# Patient Record
Sex: MALE | Race: WHITE
[De-identification: names, ages, dates, MRNs, and addresses within clinical notes are randomized per-mention and may not be internally consistent; named-entity substitution may affect disease eponyms.]

---

## 2019-08-11 ENCOUNTER — HOSPITAL ENCOUNTER (EMERGENCY)
Dept: HOSPITAL 19 - COL.ER | Age: 84
Discharge: HOME | End: 2019-08-11
Payer: MEDICARE

## 2019-08-11 VITALS — HEART RATE: 61 BPM | DIASTOLIC BLOOD PRESSURE: 91 MMHG | SYSTOLIC BLOOD PRESSURE: 118 MMHG

## 2019-08-11 VITALS — BODY MASS INDEX: 25.96 KG/M2 | HEIGHT: 69.02 IN | WEIGHT: 175.27 LBS

## 2019-08-11 VITALS — TEMPERATURE: 98.2 F

## 2019-08-11 DIAGNOSIS — K21.9: ICD-10-CM

## 2019-08-11 DIAGNOSIS — I10: ICD-10-CM

## 2019-08-11 DIAGNOSIS — Z79.82: ICD-10-CM

## 2019-08-11 DIAGNOSIS — Z95.0: ICD-10-CM

## 2019-08-11 DIAGNOSIS — R07.89: Primary | ICD-10-CM

## 2019-08-11 LAB
ALBUMIN SERPL-MCNC: 4.2 GM/DL (ref 3.5–5)
ALP SERPL-CCNC: 116 U/L (ref 50–136)
ALT SERPL-CCNC: < 6 U/L (ref 21–72)
ANION GAP SERPL CALC-SCNC: 11 MMOL/L (ref 7–16)
APTT PPP: 29.1 SECONDS (ref 26–37)
AST SERPL-CCNC: 25 U/L (ref 15–37)
BASOPHILS # BLD: 0 10*3/UL (ref 0–0.2)
BASOPHILS NFR BLD AUTO: 0.4 % (ref 0–2)
BILIRUB SERPL-MCNC: 1.3 MG/DL (ref 0–1)
BUN SERPL-MCNC: 20 MG/DL (ref 9–20)
CALCIUM SERPL-MCNC: 9.6 MG/DL (ref 8.4–10.2)
CHLORIDE SERPL-SCNC: 106 MMOL/L (ref 98–107)
CO2 SERPL-SCNC: 25 MMOL/L (ref 22–30)
CREAT SERPL-SCNC: 0.92 UMOL/L (ref 0.66–1.25)
CRP SERPL-MCNC: 2.2 MG/DL (ref 0–0.9)
EOSINOPHIL # BLD: 0.2 10*3/UL (ref 0–0.7)
EOSINOPHIL NFR BLD: 2.1 % (ref 0–4)
ERYTHROCYTE [DISTWIDTH] IN BLOOD BY AUTOMATED COUNT: 12.7 % (ref 11.5–14.5)
GLUCOSE SERPL-MCNC: 119 MG/DL (ref 74–106)
GRANULOCYTES # BLD AUTO: 65.5 % (ref 42.2–75.2)
HCT VFR BLD AUTO: 42.1 % (ref 42–52)
HGB BLD-MCNC: 14.2 G/DL (ref 13.5–18)
INR BLD: 1 (ref 0.8–3)
LYMPHOCYTES # BLD: 1.9 10*3/UL (ref 1.2–3.4)
LYMPHOCYTES NFR BLD: 23.2 % (ref 20–51)
MCH RBC QN AUTO: 32 PG (ref 27–31)
MCHC RBC AUTO-ENTMCNC: 34 G/DL (ref 33–37)
MCV RBC AUTO: 96 FL (ref 80–100)
MONOCYTES # BLD: 0.7 10*3/UL (ref 0.1–0.6)
MONOCYTES NFR BLD AUTO: 8.3 % (ref 1.7–9.3)
NEUTROPHILS # BLD: 5.3 10*3/UL (ref 1.4–6.5)
PLATELET # BLD AUTO: 201 K/MM3 (ref 130–400)
PMV BLD AUTO: 10.3 FL (ref 7.4–10.4)
POTASSIUM SERPL-SCNC: 4.1 MMOL/L (ref 3.4–5)
PROT SERPL-MCNC: 7.5 GM/DL (ref 6.4–8.2)
PROTHROMBIN TIME: 11.9 SECONDS (ref 9.7–12.8)
RBC # BLD AUTO: 4.39 M/MM3 (ref 4.2–5.6)
SODIUM SERPL-SCNC: 141 MMOL/L (ref 137–145)
TROPONIN I SERPL-MCNC: < 0.012 NG/ML (ref 0–0.04)

## 2020-05-22 ENCOUNTER — HOSPITAL ENCOUNTER (INPATIENT)
Dept: HOSPITAL 19 - COL.ER | Age: 85
LOS: 6 days | Discharge: NURSING, REHAB OR LONG-TERM CARE | DRG: 469 | End: 2020-05-28
Attending: STUDENT IN AN ORGANIZED HEALTH CARE EDUCATION/TRAINING PROGRAM | Admitting: STUDENT IN AN ORGANIZED HEALTH CARE EDUCATION/TRAINING PROGRAM
Payer: MEDICARE

## 2020-05-22 VITALS — DIASTOLIC BLOOD PRESSURE: 87 MMHG | TEMPERATURE: 97.9 F | SYSTOLIC BLOOD PRESSURE: 145 MMHG | HEART RATE: 86 BPM

## 2020-05-22 VITALS — HEART RATE: 73 BPM | SYSTOLIC BLOOD PRESSURE: 131 MMHG | TEMPERATURE: 99.8 F | DIASTOLIC BLOOD PRESSURE: 66 MMHG

## 2020-05-22 VITALS — BODY MASS INDEX: 35.02 KG/M2 | WEIGHT: 178.35 LBS | HEIGHT: 60 IN

## 2020-05-22 VITALS — TEMPERATURE: 99.9 F | SYSTOLIC BLOOD PRESSURE: 158 MMHG | DIASTOLIC BLOOD PRESSURE: 76 MMHG | HEART RATE: 69 BPM

## 2020-05-22 DIAGNOSIS — J95.821: ICD-10-CM

## 2020-05-22 DIAGNOSIS — J98.11: ICD-10-CM

## 2020-05-22 DIAGNOSIS — H90.42: ICD-10-CM

## 2020-05-22 DIAGNOSIS — Z90.79: ICD-10-CM

## 2020-05-22 DIAGNOSIS — Z51.5: ICD-10-CM

## 2020-05-22 DIAGNOSIS — I95.81: ICD-10-CM

## 2020-05-22 DIAGNOSIS — M51.36: ICD-10-CM

## 2020-05-22 DIAGNOSIS — F41.9: ICD-10-CM

## 2020-05-22 DIAGNOSIS — D64.9: ICD-10-CM

## 2020-05-22 DIAGNOSIS — A41.9: ICD-10-CM

## 2020-05-22 DIAGNOSIS — Z85.46: ICD-10-CM

## 2020-05-22 DIAGNOSIS — R13.10: ICD-10-CM

## 2020-05-22 DIAGNOSIS — M48.00: ICD-10-CM

## 2020-05-22 DIAGNOSIS — Z95.0: ICD-10-CM

## 2020-05-22 DIAGNOSIS — Z66: ICD-10-CM

## 2020-05-22 DIAGNOSIS — I21.A1: ICD-10-CM

## 2020-05-22 DIAGNOSIS — Z95.2: ICD-10-CM
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
## 2020-05-22 DIAGNOSIS — I50.22: ICD-10-CM

## 2020-05-22 DIAGNOSIS — E78.5: ICD-10-CM

## 2020-05-22 DIAGNOSIS — F03.90: ICD-10-CM

## 2020-05-22 DIAGNOSIS — E03.9: ICD-10-CM

## 2020-05-22 DIAGNOSIS — S72.001A: Primary | ICD-10-CM

## 2020-05-22 DIAGNOSIS — M43.16: ICD-10-CM

## 2020-05-22 DIAGNOSIS — I27.20: ICD-10-CM

## 2020-05-22 DIAGNOSIS — I49.5: ICD-10-CM

## 2020-05-22 DIAGNOSIS — K21.9: ICD-10-CM

## 2020-05-22 LAB
ALBUMIN SERPL-MCNC: 4.2 GM/DL (ref 3.5–5)
ALP SERPL-CCNC: 137 U/L (ref 50–136)
ALT SERPL-CCNC: 17 U/L (ref 4–49)
ANION GAP SERPL CALC-SCNC: 8 MMOL/L (ref 7–16)
APTT PPP: 31 SECONDS (ref 26–37)
AST SERPL-CCNC: 29 U/L (ref 15–37)
BASOPHILS # BLD: 0 10*3/UL (ref 0–0.2)
BASOPHILS NFR BLD AUTO: 0.1 % (ref 0–2)
BILIRUB SERPL-MCNC: 1.1 MG/DL (ref 0–1)
BUN SERPL-MCNC: 22 MG/DL (ref 9–20)
CALCIUM SERPL-MCNC: 9.6 MG/DL (ref 8.4–10.2)
CHLORIDE SERPL-SCNC: 104 MMOL/L (ref 98–107)
CO2 SERPL-SCNC: 24 MMOL/L (ref 22–30)
CREAT SERPL-SCNC: 0.93 UMOL/L (ref 0.66–1.25)
EOSINOPHIL # BLD: 0.1 10*3/UL (ref 0–0.7)
EOSINOPHIL NFR BLD: 0.4 % (ref 0–4)
ERYTHROCYTE [DISTWIDTH] IN BLOOD BY AUTOMATED COUNT: 13 % (ref 11.5–14.5)
GLUCOSE SERPL-MCNC: 116 MG/DL (ref 74–106)
GRANULOCYTES # BLD AUTO: 86.4 % (ref 42.2–75.2)
HCT VFR BLD AUTO: 40.9 % (ref 42–52)
HGB BLD-MCNC: 13.4 G/DL (ref 13.5–18)
INR BLD: 1.1 (ref 0.8–3)
LYMPHOCYTES # BLD: 1.2 10*3/UL (ref 1.2–3.4)
LYMPHOCYTES NFR BLD: 8.4 % (ref 20–51)
MCH RBC QN AUTO: 31 PG (ref 27–31)
MCHC RBC AUTO-ENTMCNC: 33 G/DL (ref 33–37)
MCV RBC AUTO: 96 FL (ref 80–100)
MONOCYTES # BLD: 0.5 10*3/UL (ref 0.1–0.6)
MONOCYTES NFR BLD AUTO: 3.8 % (ref 1.7–9.3)
NEUTROPHILS # BLD: 11.9 10*3/UL (ref 1.4–6.5)
PH UR STRIP.AUTO: 5 [PH] (ref 5–8)
PLATELET # BLD AUTO: 196 K/MM3 (ref 130–400)
PMV BLD AUTO: 10 FL (ref 7.4–10.4)
POTASSIUM SERPL-SCNC: 4 MMOL/L (ref 3.4–5)
PROT SERPL-MCNC: 7.4 GM/DL (ref 6.4–8.2)
PROTHROMBIN TIME: 11.9 SECONDS (ref 9.7–12.8)
RBC # BLD AUTO: 4.28 M/MM3 (ref 4.2–5.6)
RBC # UR: (no result) /HPF
SODIUM SERPL-SCNC: 136 MMOL/L (ref 137–145)
SP GR UR STRIP.AUTO: 1.02 (ref 1–1.03)
SQUAMOUS # URNS: (no result) /HPF
TROPONIN I SERPL-MCNC: < 0.012 NG/ML (ref 0–0.04)
URN COLLECT METHOD CLASS: (no result)

## 2020-05-22 PROCEDURE — C1713 ANCHOR/SCREW BN/BN,TIS/BN: HCPCS

## 2020-05-22 PROCEDURE — C1776 JOINT DEVICE (IMPLANTABLE): HCPCS

## 2020-05-22 PROCEDURE — P9047 ALBUMIN (HUMAN), 25%, 50ML: HCPCS

## 2020-05-22 PROCEDURE — A9284 NON-ELECTRONIC SPIROMETER: HCPCS

## 2020-05-22 NOTE — NUR
Due to the patient's history of dementia, SW contacted the patient's wife,
Joanna to complete initial intake. The patient lives in [place] with Joanna.
The patient does not use a cane or walker but they do have those available in
the home. The patient's PCP is Dr. Lopez and patient receives medications via
mail, delivery or  from [facility]. The patient does not have
advanced directives in the EMR. Joanna states they are completed and designate
herself and their daughter Shayla. Shayla lives in [place] and will be
visiting beginning 5/23 to support the family. They have another daughter
named Radha Stoddard who lives close by. Due to the patient's fracture he will
likely need placment. JESUSITA discussed Medicare.gov's list of post acute rehab
facilities in the [place] area. The first choice is [facility].  Joanna was hesistant to give a second choice because she want to
patient to be closer to home. However, she did end up choosing [facility] in [place] as second choice. JESUSITA faxed referrals. Will continue to
monitor.

## 2020-05-22 NOTE — NUR
Patient arrived to floor from ED via bed. Patient was transferred to room bed
via slide board with x3 assist. Patient is alert, appears to be confused/does
not comprehend when questions are asked to him. Patient also appears to be
comfortable, in no obvious pain or distress. IVF initiated per order, MARQUEZ hose
applied to uninjured leg, and rivera catheter inserted per order via sterile
technique. Patient tolerated procedure well. Fall signage placed, bed alarm
on. Admission B completed while on the phone with patient's wife, who is his
DPOA.

## 2020-05-22 NOTE — NUR
Patient has become persistently confused over the last hour. Patient has tried
to get out of bed unassisted several times, continues to deny needs, PRN pain
medication administered per order. Staff member at bedside currently, patient
does not respond to re-orientation. Bed alarm on.

## 2020-05-22 NOTE — NUR
Received report from KAYLEY Madrid. Pt is currently lying in bed looking out of
his window. Pt bed is in lowest position and call light is on

## 2020-05-22 NOTE — NUR
Spoke with DAVID Gomes she informed me that Dr. Boucher would see pt in the am
before surgery. She stated that surgery time would be pushed back to 0900. She
stated she also spoke to pt wife and explain the surgery to her. She stated pt
will go forward with surgery once he is cleared. Pt is also NPO after
midnight. Pt is currently lying in bed awake. Pt has his call light within
reach and his bed is in lowest position. His tay hose is on the unaffected
leg, he has been refusing the SCD pumps on his legs. Pt currently has fluids
running at this time.Pt lungs sounds were clear and heart sounds were normal
S1 and S2 sounds pt bowel sounds were hypoactive in all quads. Pt has been
very quite just looking around and lying in bed. His bed alarm is also on at
this time.

## 2020-05-23 VITALS — OXYGEN SATURATION: 85 %

## 2020-05-23 VITALS — OXYGEN SATURATION: 91 %

## 2020-05-23 VITALS — OXYGEN SATURATION: 89 %

## 2020-05-23 VITALS — OXYGEN SATURATION: 92 %

## 2020-05-23 VITALS — OXYGEN SATURATION: 100 %

## 2020-05-23 VITALS — OXYGEN SATURATION: 97 %

## 2020-05-23 VITALS — OXYGEN SATURATION: 96 %

## 2020-05-23 VITALS — OXYGEN SATURATION: 87 %

## 2020-05-23 VITALS — OXYGEN SATURATION: 94 %

## 2020-05-23 VITALS — TEMPERATURE: 98.5 F | HEART RATE: 77 BPM | DIASTOLIC BLOOD PRESSURE: 65 MMHG | SYSTOLIC BLOOD PRESSURE: 121 MMHG

## 2020-05-23 VITALS — OXYGEN SATURATION: 95 %

## 2020-05-23 VITALS — DIASTOLIC BLOOD PRESSURE: 69 MMHG | TEMPERATURE: 98.2 F | HEART RATE: 69 BPM | SYSTOLIC BLOOD PRESSURE: 102 MMHG

## 2020-05-23 VITALS — OXYGEN SATURATION: 99 %

## 2020-05-23 VITALS — OXYGEN SATURATION: 90 %

## 2020-05-23 VITALS
SYSTOLIC BLOOD PRESSURE: 125 MMHG | DIASTOLIC BLOOD PRESSURE: 84 MMHG | TEMPERATURE: 99.2 F | OXYGEN SATURATION: 96 % | HEART RATE: 71 BPM

## 2020-05-23 VITALS — OXYGEN SATURATION: 93 %

## 2020-05-23 VITALS — OXYGEN SATURATION: 98 %

## 2020-05-23 VITALS — OXYGEN SATURATION: 78 %

## 2020-05-23 VITALS — HEART RATE: 82 BPM | DIASTOLIC BLOOD PRESSURE: 58 MMHG | SYSTOLIC BLOOD PRESSURE: 111 MMHG | TEMPERATURE: 98.1 F

## 2020-05-23 VITALS — OXYGEN SATURATION: 79 %

## 2020-05-23 VITALS — DIASTOLIC BLOOD PRESSURE: 58 MMHG | HEART RATE: 65 BPM | SYSTOLIC BLOOD PRESSURE: 102 MMHG

## 2020-05-23 VITALS — OXYGEN SATURATION: 81 %

## 2020-05-23 VITALS — OXYGEN SATURATION: 82 %

## 2020-05-23 VITALS — DIASTOLIC BLOOD PRESSURE: 67 MMHG | TEMPERATURE: 98.1 F | SYSTOLIC BLOOD PRESSURE: 89 MMHG | HEART RATE: 68 BPM

## 2020-05-23 VITALS — OXYGEN SATURATION: 88 %

## 2020-05-23 VITALS — OXYGEN SATURATION: 83 %

## 2020-05-23 VITALS — OXYGEN SATURATION: 77 %

## 2020-05-23 VITALS — OXYGEN SATURATION: 70 %

## 2020-05-23 VITALS — OXYGEN SATURATION: 84 %

## 2020-05-23 LAB
ANION GAP SERPL CALC-SCNC: 9 MMOL/L (ref 7–16)
BASE EXCESS BLDA CALC-SCNC: -6.2 MMOL/L (ref -2–2)
BUN SERPL-MCNC: 25 MG/DL (ref 9–20)
CALCIUM SERPL-MCNC: 9.4 MG/DL (ref 8.4–10.2)
CHLORIDE SERPL-SCNC: 103 MMOL/L (ref 98–107)
CO2 BLDA-SCNC: 18.4 MMOL/L
CO2 SERPL-SCNC: 23 MMOL/L (ref 22–30)
CREAT SERPL-SCNC: 0.96 UMOL/L (ref 0.66–1.25)
ERYTHROCYTE [DISTWIDTH] IN BLOOD BY AUTOMATED COUNT: 13.2 % (ref 11.5–14.5)
GLUCOSE SERPL-MCNC: 136 MG/DL (ref 74–106)
HCO3 BLDA-SCNC: 17.5 MEQ/L (ref 22–26)
HCT VFR BLD AUTO: 36.9 % (ref 42–52)
HGB BLD-MCNC: 12.5 G/DL (ref 13.5–18)
LYMPHOCYTES NFR BLD MANUAL: 8 % (ref 20–51)
MCH RBC QN AUTO: 32 PG (ref 27–31)
MCHC RBC AUTO-ENTMCNC: 34 G/DL (ref 33–37)
MCV RBC AUTO: 94 FL (ref 80–100)
MONOCYTES NFR BLD: 4 % (ref 1.7–9.3)
NEUTS BAND NFR BLD: 13 % (ref 0–10)
NEUTS SEG NFR BLD MANUAL: 75 % (ref 42–75.2)
PCO2 BLDA: 29.1 MMHG (ref 35–45)
PLATELET # BLD AUTO: 171 K/MM3 (ref 130–400)
PLATELET BLD QL SMEAR: NORMAL
PMV BLD AUTO: 10.5 FL (ref 7.4–10.4)
PO2 BLDA: 66.8 MMHG (ref 80–100)
POTASSIUM SERPL-SCNC: 4.2 MMOL/L (ref 3.4–5)
RBC # BLD AUTO: 3.91 M/MM3 (ref 4.2–5.6)
SAO2 % BLDA: 93 % (ref 92–100)
SODIUM SERPL-SCNC: 136 MMOL/L (ref 137–145)

## 2020-05-23 PROCEDURE — 0SRR0J9 REPLACEMENT OF RIGHT HIP JOINT, FEMORAL SURFACE WITH SYNTHETIC SUBSTITUTE, CEMENTED, OPEN APPROACH: ICD-10-PCS | Performed by: ORTHOPAEDIC SURGERY

## 2020-05-23 NOTE — NUR
Called Pt's wife, Joanna and updated on pt status. Pt's VSS. Pt resting in bed.
Opens eyes to name and smiles in response to questions. Pt appears
comfortable. Pt's wife states he has hearing aids but battery probably .
Right hip dressing c/d/i. right leg elevated onpillow. Ice pack placed on
right hip.

## 2020-05-23 NOTE — NUR
Spoke with the patient's wife, Joanna Ramachandran, and received consent for patient to
have right hip hemiarthroplasty. KAYLEY Torres, speaks with Joanna and also obtains
consent. Consent completed and placed on chart.

## 2020-05-23 NOTE — NUR
PT is resting in bed, picks at SpO2 monitor on finger. SpO2 monitor moved to
ear to keep out of patient sight to reduce agitation. PT not responding to
questions, smiles whenever staff speaks to him. Bed alarms are on, call light
within reach, water offered and accepted. Will continue to monitor for safety
and signs of discomfort.

## 2020-05-23 NOTE — NUR
Pt more alert. Pt [place], hard to follow commands. NOLAN. right leg elevated on
pillow. Right hip dressing c/d/i. ice pack off right hip. Pt remains on
4L/oxymask. Voss to DD with clear, dk yellow urine. VSS. Call light in reach.

## 2020-05-23 NOTE — NUR
Dr. Boucher calls and explains she will be in to see the patient around 0840 for
cardiology clearance.

## 2020-05-23 NOTE — NUR
Lying in bed with eyes open. Patient is alert but is confused. Denies pain at
this time. Voss to dependent drainage draining clear yellow urine.

## 2020-05-23 NOTE — NUR
Phone report received from Jodie PACU RN. Pt arrived to ICU bed 5 via bed. Pt
alert, no verbal response to questions. Does not follow simple commands. Pt
only smiles in response to questions. Right hip dressing c/d/i. right leg
elevated on pillow. thigh high tay hose and SCDS on BLE. Pt connected to CRM.
HR SR 60s. SBP 80-90s. Voss to DD with clear, dk yellow output. Bed exit
alarm on. Call light in reach. Will monitor.

## 2020-05-23 NOTE — NUR
Pt has not slept during the night. He is lying in bed very quite just looking
around. Pt was given a warm blanket and pt just smiled. He has not been
seeking to get out of bed. Pt has his call light within reach and his bed is
in lowest position and alarm is on at this time.

## 2020-05-23 NOTE — NUR
Reported off to KAYLEY Lynn. Pt is currently lying in bed. Pt did not sleep well
during the night. He was very quite lying in bed but did not sleep. Pt did
pull at his rivera quite a bit. Pt stat lock for his rivera was replace a
couple of time. Pt IV was stablized by wraping it. Pt has his call light
within reach. He also takes his oxygen off. Bed is in lowest positon and call
light is within reach.

## 2020-05-24 VITALS — OXYGEN SATURATION: 95 %

## 2020-05-24 VITALS — OXYGEN SATURATION: 94 %

## 2020-05-24 VITALS — OXYGEN SATURATION: 98 %

## 2020-05-24 VITALS — OXYGEN SATURATION: 97 %

## 2020-05-24 VITALS — OXYGEN SATURATION: 87 %

## 2020-05-24 VITALS — OXYGEN SATURATION: 93 %

## 2020-05-24 VITALS — OXYGEN SATURATION: 100 %

## 2020-05-24 VITALS — OXYGEN SATURATION: 96 %

## 2020-05-24 VITALS — OXYGEN SATURATION: 92 %

## 2020-05-24 VITALS — OXYGEN SATURATION: 69 %

## 2020-05-24 VITALS — OXYGEN SATURATION: 86 %

## 2020-05-24 VITALS
HEART RATE: 71 BPM | TEMPERATURE: 98.6 F | SYSTOLIC BLOOD PRESSURE: 135 MMHG | DIASTOLIC BLOOD PRESSURE: 80 MMHG | OXYGEN SATURATION: 96 %

## 2020-05-24 VITALS — OXYGEN SATURATION: 91 %

## 2020-05-24 VITALS — OXYGEN SATURATION: 89 %

## 2020-05-24 VITALS — OXYGEN SATURATION: 27 %

## 2020-05-24 VITALS — HEART RATE: 79 BPM | DIASTOLIC BLOOD PRESSURE: 66 MMHG | SYSTOLIC BLOOD PRESSURE: 113 MMHG | TEMPERATURE: 100 F

## 2020-05-24 VITALS — OXYGEN SATURATION: 99 %

## 2020-05-24 VITALS — OXYGEN SATURATION: 84 %

## 2020-05-24 VITALS — OXYGEN SATURATION: 88 %

## 2020-05-24 VITALS — HEART RATE: 90 BPM | TEMPERATURE: 98 F | DIASTOLIC BLOOD PRESSURE: 92 MMHG | SYSTOLIC BLOOD PRESSURE: 163 MMHG

## 2020-05-24 VITALS — TEMPERATURE: 97.4 F | SYSTOLIC BLOOD PRESSURE: 94 MMHG | DIASTOLIC BLOOD PRESSURE: 64 MMHG | HEART RATE: 78 BPM

## 2020-05-24 VITALS — OXYGEN SATURATION: 90 %

## 2020-05-24 VITALS — DIASTOLIC BLOOD PRESSURE: 86 MMHG | TEMPERATURE: 98 F | HEART RATE: 79 BPM | SYSTOLIC BLOOD PRESSURE: 138 MMHG

## 2020-05-24 VITALS — OXYGEN SATURATION: 78 %

## 2020-05-24 VITALS
TEMPERATURE: 98 F | OXYGEN SATURATION: 98 % | HEART RATE: 74 BPM | SYSTOLIC BLOOD PRESSURE: 96 MMHG | DIASTOLIC BLOOD PRESSURE: 70 MMHG

## 2020-05-24 VITALS — OXYGEN SATURATION: 67 %

## 2020-05-24 VITALS — OXYGEN SATURATION: 34 %

## 2020-05-24 VITALS — OXYGEN SATURATION: 81 %

## 2020-05-24 VITALS — OXYGEN SATURATION: 62 %

## 2020-05-24 VITALS — OXYGEN SATURATION: 85 %

## 2020-05-24 VITALS — TEMPERATURE: 99.4 F

## 2020-05-24 VITALS — OXYGEN SATURATION: 72 %

## 2020-05-24 VITALS — OXYGEN SATURATION: 83 %

## 2020-05-24 LAB
ANION GAP SERPL CALC-SCNC: 7 MMOL/L (ref 7–16)
BASE EXCESS BLDA CALC-SCNC: -10 MMOL/L (ref -2–2)
BASOPHILS # BLD: 0 10*3/UL (ref 0–0.2)
BASOPHILS NFR BLD AUTO: 0.1 % (ref 0–2)
BUN SERPL-MCNC: 33 MG/DL (ref 9–20)
CALCIUM SERPL-MCNC: 8.7 MG/DL (ref 8.4–10.2)
CHLORIDE SERPL-SCNC: 109 MMOL/L (ref 98–107)
CO2 BLDA-SCNC: 12.7 MMOL/L
CO2 SERPL-SCNC: 21 MMOL/L (ref 22–30)
CREAT SERPL-SCNC: 0.94 UMOL/L (ref 0.66–1.25)
EOSINOPHIL # BLD: 0 10*3/UL (ref 0–0.7)
EOSINOPHIL NFR BLD: 0 % (ref 0–4)
ERYTHROCYTE [DISTWIDTH] IN BLOOD BY AUTOMATED COUNT: 13.6 % (ref 11.5–14.5)
GLUCOSE SERPL-MCNC: 118 MG/DL (ref 74–106)
GRANULOCYTES # BLD AUTO: 86.5 % (ref 42.2–75.2)
HCO3 BLDA-SCNC: 12.1 MEQ/L (ref 22–26)
HCT VFR BLD AUTO: 29.6 % (ref 42–52)
HGB BLD-MCNC: 9.9 G/DL (ref 13.5–18)
INHALED O2 CONCENTRATION: 45 %
INR BLD: 1.2 (ref 0.8–3)
LYMPHOCYTES # BLD: 0.9 10*3/UL (ref 1.2–3.4)
LYMPHOCYTES NFR BLD: 7.1 % (ref 20–51)
MCH RBC QN AUTO: 32 PG (ref 27–31)
MCHC RBC AUTO-ENTMCNC: 33 G/DL (ref 33–37)
MCV RBC AUTO: 96 FL (ref 80–100)
MONOCYTES # BLD: 0.7 10*3/UL (ref 0.1–0.6)
MONOCYTES NFR BLD AUTO: 5.5 % (ref 1.7–9.3)
NEUTROPHILS # BLD: 10.9 10*3/UL (ref 1.4–6.5)
PCO2 BLDA: 18.7 MMHG (ref 35–45)
PLATELET # BLD AUTO: 119 K/MM3 (ref 130–400)
PMV BLD AUTO: 10.6 FL (ref 7.4–10.4)
PO2 BLDA: 145.7 MMHG (ref 80–100)
POTASSIUM SERPL-SCNC: 4.3 MMOL/L (ref 3.4–5)
PROTHROMBIN TIME: 13.6 SECONDS (ref 9.7–12.8)
RBC # BLD AUTO: 3.07 M/MM3 (ref 4.2–5.6)
SAO2 % BLDA: 98.7 % (ref 92–100)
SODIUM SERPL-SCNC: 136 MMOL/L (ref 137–145)

## 2020-05-24 NOTE — NUR
Bedside report given to KAYLEY Last. Patient is resting in bed. Call light and
bedside table are within reach.

## 2020-05-24 NOTE — NUR
Bedside report received from Mandy Salinas. Providers at bedside during
time. Pt in bed with eyes open intermittently. Would not speak to this nurse
although was noted to be looking at the staff members in the room. Pt followed
commands when it came to squeezing this nurse hands and slightly waved at
offgoing nurse.

## 2020-05-24 NOTE — NUR
Pt's oxygen needs decreased last night post anesthesia with improved
alertness and on RA rapidly after in ICU; CPAP support and SVNs not needed.

## 2020-05-24 NOTE — NUR
This nurse and KAYLEY Graves was cleaning the patient up and getting him ready
when he started experiencing tachypnea and obvious shortness of breath.
Patient was sitting up on side of bed but was unable to hold himself up d/t
his labored breathing. Patience, RRT, called and breathing treatment started.
SPO2 94% on 5 liters O2. Patient positioned supine in bed with 30 degree HOB
angle. This nurse called Dr. Kay and asked about a possible CT d/t his
sudden symptoms. Orders for Bipap received. Dr. Kay states he will come and
see patient.

## 2020-05-25 VITALS — TEMPERATURE: 99.2 F | SYSTOLIC BLOOD PRESSURE: 115 MMHG | HEART RATE: 94 BPM | DIASTOLIC BLOOD PRESSURE: 63 MMHG

## 2020-05-25 VITALS — TEMPERATURE: 98.5 F | SYSTOLIC BLOOD PRESSURE: 110 MMHG | DIASTOLIC BLOOD PRESSURE: 68 MMHG | HEART RATE: 60 BPM

## 2020-05-25 VITALS — DIASTOLIC BLOOD PRESSURE: 65 MMHG | SYSTOLIC BLOOD PRESSURE: 111 MMHG | HEART RATE: 63 BPM | TEMPERATURE: 98.2 F

## 2020-05-25 VITALS — DIASTOLIC BLOOD PRESSURE: 67 MMHG | HEART RATE: 63 BPM | SYSTOLIC BLOOD PRESSURE: 126 MMHG | TEMPERATURE: 98.3 F

## 2020-05-25 VITALS — HEART RATE: 63 BPM | TEMPERATURE: 98.8 F | SYSTOLIC BLOOD PRESSURE: 125 MMHG | DIASTOLIC BLOOD PRESSURE: 78 MMHG

## 2020-05-25 VITALS — OXYGEN SATURATION: 96 %

## 2020-05-25 LAB
HCT VFR BLD AUTO: 29.1 % (ref 42–52)
HGB BLD-MCNC: 9.5 G/DL (ref 13.5–18)
INR BLD: 1.2 (ref 0.8–3)
PROTHROMBIN TIME: 13.5 SECONDS (ref 9.7–12.8)

## 2020-05-25 NOTE — NUR
Pt report provided to Arabella ZALDIVAR. Pt resting in bed. Was aroused for morning
medication administration.

## 2020-05-25 NOTE — NUR
Since being admitted to the floor patient has been resting. Has been arousable
to name, but falls back to sleep. Patient is confused and nursing staff unsure
if cochlear implant is working. VSS. 2L NC O2. No reported SOB. IV CDI.
Abbductor wedge in place and pillow under right leg. No further needs
expressed from patient. Call light within reach. Bed alarm on. Fall
precautions in place

## 2020-05-25 NOTE — NUR
Patient to room 326 by bed from ICU. Report from KAYLEY Bell. Patient resting in
bed, does arouse when spoken to, do have to talk louder. PT had concerns over
patients mentation. Cochlear implant to right side. Patient does open eyes,
but is not tracking, then closes eyes. Nurse will continue to monitor patients
mentation. Call light within reach. Bed alarm on

## 2020-05-26 VITALS — TEMPERATURE: 97.8 F | DIASTOLIC BLOOD PRESSURE: 64 MMHG | HEART RATE: 82 BPM | SYSTOLIC BLOOD PRESSURE: 126 MMHG

## 2020-05-26 VITALS — TEMPERATURE: 98 F | DIASTOLIC BLOOD PRESSURE: 64 MMHG | HEART RATE: 66 BPM | SYSTOLIC BLOOD PRESSURE: 115 MMHG

## 2020-05-26 VITALS — DIASTOLIC BLOOD PRESSURE: 53 MMHG | HEART RATE: 74 BPM | SYSTOLIC BLOOD PRESSURE: 107 MMHG | TEMPERATURE: 98.5 F

## 2020-05-26 VITALS — TEMPERATURE: 100.7 F | HEART RATE: 81 BPM | SYSTOLIC BLOOD PRESSURE: 132 MMHG | DIASTOLIC BLOOD PRESSURE: 61 MMHG

## 2020-05-26 VITALS — DIASTOLIC BLOOD PRESSURE: 53 MMHG | SYSTOLIC BLOOD PRESSURE: 98 MMHG | TEMPERATURE: 98.1 F | HEART RATE: 75 BPM

## 2020-05-26 VITALS — DIASTOLIC BLOOD PRESSURE: 48 MMHG | SYSTOLIC BLOOD PRESSURE: 94 MMHG | HEART RATE: 66 BPM | TEMPERATURE: 97.7 F

## 2020-05-26 LAB
ANION GAP SERPL CALC-SCNC: 7 MMOL/L (ref 7–16)
BUN SERPL-MCNC: 40 MG/DL (ref 9–20)
CALCIUM SERPL-MCNC: 8.6 MG/DL (ref 8.4–10.2)
CHLORIDE SERPL-SCNC: 109 MMOL/L (ref 98–107)
CO2 SERPL-SCNC: 21 MMOL/L (ref 22–30)
CREAT SERPL-SCNC: 0.95 UMOL/L (ref 0.66–1.25)
ERYTHROCYTE [DISTWIDTH] IN BLOOD BY AUTOMATED COUNT: 13.9 % (ref 11.5–14.5)
GLUCOSE SERPL-MCNC: 129 MG/DL (ref 74–106)
HCT VFR BLD AUTO: 28.6 % (ref 42–52)
HGB BLD-MCNC: 9.3 G/DL (ref 13.5–18)
HYALINE CASTS #/AREA URNS LPF: (no result) /LPF
INR BLD: 1.2 (ref 0.8–3)
LYMPHOCYTES NFR BLD MANUAL: 8 % (ref 20–51)
MAGNESIUM SERPL-MCNC: 2.4 MG/DL (ref 1.6–2.3)
MCH RBC QN AUTO: 31 PG (ref 27–31)
MCHC RBC AUTO-ENTMCNC: 33 G/DL (ref 33–37)
MCV RBC AUTO: 96 FL (ref 80–100)
MONOCYTES NFR BLD: 4 % (ref 1.7–9.3)
NEUTS BAND NFR BLD: 14 % (ref 0–10)
NEUTS SEG NFR BLD MANUAL: 74 % (ref 42–75.2)
PH UR STRIP.AUTO: 5 [PH] (ref 5–8)
PLATELET # BLD AUTO: 108 K/MM3 (ref 130–400)
PLATELET BLD QL SMEAR: (no result)
PMV BLD AUTO: 11.9 FL (ref 7.4–10.4)
POTASSIUM SERPL-SCNC: 4 MMOL/L (ref 3.4–5)
PROTHROMBIN TIME: 13.7 SECONDS (ref 9.7–12.8)
RBC # BLD AUTO: 2.97 M/MM3 (ref 4.2–5.6)
RBC # UR STRIP.AUTO: (no result) /UL
RBC # UR: (no result) /HPF
SODIUM SERPL-SCNC: 137 MMOL/L (ref 137–145)
SP GR UR STRIP.AUTO: 1.03 (ref 1–1.03)
SQUAMOUS # URNS: (no result) /HPF
UA DIPSTICK PNL UR STRIP.AUTO: (no result)
URN COLLECT METHOD CLASS: (no result)
UROBILINOGEN UR STRIP.AUTO-MCNC: >=4 MG/DL

## 2020-05-26 NOTE — NUR
Pt. sitting up in chair at this time. Pt. is alert and oriented at this time.
INT to lt. forearm patent.  Dressing to rt. hip CDI.  Pt. denies need for pain
medication at this time.  Pt. denies further needs, call light within reach.

## 2020-05-26 NOTE — NUR
Patient resting in bed at this time. Patient remains alert but confused while
awake and continues to be largely unable to communicate. Patient has given no
indication of pain, abductor pillow in place, assisted to reposition
frequently. Voss remains in place per order. Bed alarm on, call light within
reach.

## 2020-05-26 NOTE — NUR
I tried to talk with Roberto earlier today but had minimal response from him
and he then nodded off to sleep.  I was able to give him a drink of water
through a straw and when I asked him how he was doing he gave me a thumbs down
hand gesture.  I also called and spoke with his wife Joanna who was able to tell
me how to replace his battery for his implant which made a huge difference in
is ability to interact.  I advised him that Joanna is missing him and we are
working to get him closer to home as soon as we can.  He smiled with this. He
likes to put puzzles together at home and indicated that he would be willing
to do that today if I can find a puzzle for him.  Wife feels that this has
been a dramatic event for him and at this point would like to focus on rehab.
She does not feel he is ready for hospice at this time.

## 2020-05-26 NOTE — NUR
[facility] updates [facility] SW
spoke with Flavia at [facility] who advised they are likely able to accept. JESUSITA
also spoke with Bethany who reports it looks like they will be able to accept
as well but are aware they are second preference. SW attempted to call wife,
Joanna but line was busy. SW to continue to follow.

## 2020-05-27 VITALS — TEMPERATURE: 97.9 F | HEART RATE: 67 BPM | SYSTOLIC BLOOD PRESSURE: 112 MMHG | DIASTOLIC BLOOD PRESSURE: 59 MMHG

## 2020-05-27 VITALS — SYSTOLIC BLOOD PRESSURE: 107 MMHG | TEMPERATURE: 97.9 F | HEART RATE: 66 BPM | DIASTOLIC BLOOD PRESSURE: 52 MMHG

## 2020-05-27 VITALS — SYSTOLIC BLOOD PRESSURE: 121 MMHG | DIASTOLIC BLOOD PRESSURE: 59 MMHG | HEART RATE: 65 BPM | TEMPERATURE: 98.7 F

## 2020-05-27 VITALS — DIASTOLIC BLOOD PRESSURE: 59 MMHG | TEMPERATURE: 98.6 F | SYSTOLIC BLOOD PRESSURE: 119 MMHG | HEART RATE: 78 BPM

## 2020-05-27 VITALS — HEART RATE: 66 BPM | DIASTOLIC BLOOD PRESSURE: 60 MMHG | TEMPERATURE: 99.2 F | SYSTOLIC BLOOD PRESSURE: 118 MMHG

## 2020-05-27 VITALS — TEMPERATURE: 97.4 F | SYSTOLIC BLOOD PRESSURE: 119 MMHG | HEART RATE: 68 BPM | DIASTOLIC BLOOD PRESSURE: 58 MMHG

## 2020-05-27 LAB
ANION GAP SERPL CALC-SCNC: 8 MMOL/L (ref 7–16)
BASOPHILS # BLD: 0 10*3/UL (ref 0–0.2)
BASOPHILS NFR BLD AUTO: 0.2 % (ref 0–2)
BUN SERPL-MCNC: 45 MG/DL (ref 9–20)
CALCIUM SERPL-MCNC: 8.7 MG/DL (ref 8.4–10.2)
CHLORIDE SERPL-SCNC: 109 MMOL/L (ref 98–107)
CHOLEST SPEC-SCNC: 139 MG/DL (ref 120–200)
CHOLEST/HDLC SERPL-SRTO: 9.2
CO2 SERPL-SCNC: 20 MMOL/L (ref 22–30)
CREAT SERPL-SCNC: 0.98 UMOL/L (ref 0.66–1.25)
EOSINOPHIL # BLD: 0 10*3/UL (ref 0–0.7)
EOSINOPHIL NFR BLD: 0.1 % (ref 0–4)
ERYTHROCYTE [DISTWIDTH] IN BLOOD BY AUTOMATED COUNT: 14.1 % (ref 11.5–14.5)
GLUCOSE SERPL-MCNC: 108 MG/DL (ref 74–106)
GRANULOCYTES # BLD AUTO: 84.7 % (ref 42.2–75.2)
HCT VFR BLD AUTO: 28.2 % (ref 42–52)
HDLC SERPL-MCNC: 15 MG/DL
HGB BLD-MCNC: 9.5 G/DL (ref 13.5–18)
INR BLD: 1.3 (ref 0.8–3)
LDLC SERPL-MCNC: 90 MG/DL
LYMPHOCYTES # BLD: 1 10*3/UL (ref 1.2–3.4)
LYMPHOCYTES NFR BLD: 7.3 % (ref 20–51)
MCH RBC QN AUTO: 32 PG (ref 27–31)
MCHC RBC AUTO-ENTMCNC: 34 G/DL (ref 33–37)
MCV RBC AUTO: 95 FL (ref 80–100)
MONOCYTES # BLD: 0.8 10*3/UL (ref 0.1–0.6)
MONOCYTES NFR BLD AUTO: 6.3 % (ref 1.7–9.3)
NEUTROPHILS # BLD: 11.2 10*3/UL (ref 1.4–6.5)
PLATELET # BLD AUTO: 125 K/MM3 (ref 130–400)
PMV BLD AUTO: 11.9 FL (ref 7.4–10.4)
POTASSIUM SERPL-SCNC: 3.5 MMOL/L (ref 3.4–5)
PROTHROMBIN TIME: 14.3 SECONDS (ref 9.7–12.8)
RBC # BLD AUTO: 2.97 M/MM3 (ref 4.2–5.6)
SODIUM SERPL-SCNC: 137 MMOL/L (ref 137–145)
TRIGL SERPL-MCNC: 171 MG/DL

## 2020-05-27 NOTE — NUR
Pt. given am meds.  Pt. began choking on water.  Coughed up a large amount of
watery phlegm.  Lungs sound more corse after episode.  Pt. nodes head when
asked if he feels like it all came up.

## 2020-05-27 NOTE — NUR
Report received. Assumed care for night shift. Assessment complete.
A&O-confused. Sitting up in bed watching TV. Denies pain/nausea/shortness of
breath. Aquacell to right hip-dressing CDI. Abductor pillow on. Fresh ice pack
applied to hip. TEDs bilat. Denies needs. Call light in reach/alarm on. Will
monitor.

## 2020-05-27 NOTE — NUR
Repositioned in bed with abductor pillow in place. Denies pain. Incontinent of
urine-perj care provided/barrier cream applied due to redness to scrotum.
Fresh ice pack applied to right hip. Did take medications with applesauce
without difficulty. Call light in reach/bed alarm on. Will monitor.

## 2020-05-27 NOTE — NUR
attended clinical rounds with the team and patient will likely
discharge tomorrow. JESUSITA contacted Lisa LUA at [facility]
and faxed updates. Lisa requested a morning discharge. JESUSITA contacted NICOLAS Jensen who advised they could see patient early tomorrow. JESUSITA contacted patient's
wife, Joanna to provide update. SW to continue to follow.

## 2020-05-28 VITALS — DIASTOLIC BLOOD PRESSURE: 64 MMHG | TEMPERATURE: 98.7 F | SYSTOLIC BLOOD PRESSURE: 125 MMHG | HEART RATE: 74 BPM

## 2020-05-28 VITALS — TEMPERATURE: 98.7 F | DIASTOLIC BLOOD PRESSURE: 64 MMHG | HEART RATE: 74 BPM | SYSTOLIC BLOOD PRESSURE: 116 MMHG

## 2020-05-28 VITALS — HEART RATE: 74 BPM | SYSTOLIC BLOOD PRESSURE: 125 MMHG | DIASTOLIC BLOOD PRESSURE: 64 MMHG | TEMPERATURE: 98.7 F

## 2020-05-28 VITALS — DIASTOLIC BLOOD PRESSURE: 59 MMHG | TEMPERATURE: 99 F | HEART RATE: 70 BPM | SYSTOLIC BLOOD PRESSURE: 121 MMHG

## 2020-05-28 LAB
ANION GAP SERPL CALC-SCNC: 7 MMOL/L (ref 7–16)
BUN SERPL-MCNC: 39 MG/DL (ref 9–20)
CALCIUM SERPL-MCNC: 8.7 MG/DL (ref 8.4–10.2)
CHLORIDE SERPL-SCNC: 110 MMOL/L (ref 98–107)
CO2 SERPL-SCNC: 22 MMOL/L (ref 22–30)
CREAT SERPL-SCNC: 0.93 UMOL/L (ref 0.66–1.25)
ERYTHROCYTE [DISTWIDTH] IN BLOOD BY AUTOMATED COUNT: 13.9 % (ref 11.5–14.5)
GLUCOSE SERPL-MCNC: 109 MG/DL (ref 74–106)
HCT VFR BLD AUTO: 30.1 % (ref 42–52)
HGB BLD-MCNC: 10 G/DL (ref 13.5–18)
INR BLD: 1.4 (ref 0.8–3)
LYMPHOCYTES NFR BLD MANUAL: 8 % (ref 20–51)
MCH RBC QN AUTO: 31 PG (ref 27–31)
MCHC RBC AUTO-ENTMCNC: 33 G/DL (ref 33–37)
MCV RBC AUTO: 94 FL (ref 80–100)
MONOCYTES NFR BLD: 1 % (ref 1.7–9.3)
NEUTS BAND NFR BLD: 12 % (ref 0–10)
NEUTS SEG NFR BLD MANUAL: 79 % (ref 42–75.2)
OVALOCYTES BLD QL SMEAR: (no result)
PLATELET # BLD AUTO: 146 K/MM3 (ref 130–400)
PLATELET BLD QL SMEAR: NORMAL
PMV BLD AUTO: 11.4 FL (ref 7.4–10.4)
POTASSIUM SERPL-SCNC: 3.3 MMOL/L (ref 3.4–5)
PROTHROMBIN TIME: 15.2 SECONDS (ref 9.7–12.8)
RBC # BLD AUTO: 3.2 M/MM3 (ref 4.2–5.6)
SODIUM SERPL-SCNC: 139 MMOL/L (ref 137–145)

## 2020-05-28 NOTE — NUR
attended clinical rounds with the team. Patient's wife, Joanna at
bedside. Patient to discharge to [facility] today. JESUSITA
contacted Lora at  and set transport time for 1100. SW provided
transport time to patient, patient's wife Joanna, and RN Daisha. SW read IM form
aloud to patient and patient's wife who verbalized understanding and provided
verbal consent as signature. JESUSITA placed form in chart and provided copy to
patient. SW faxed discharge orders and COVID assessment to . JESUSITA also faxed
copy of operative notes per Lora's request. JESUSITA then contacted Obed at
[facility] to update on patient's discharge. No additional needs at this time.

## 2020-05-28 NOTE — NUR
Patient in bed resting. Aquacel to right hip is CDI. Abductor pillow in place.
Amado hose to BLE. Denies pain at this time. Eccymosis noted to right hip.
Denies further needs at this time.

## 2020-05-28 NOTE — NUR
Palliative care nurse also attended rounds and talked with wife after this.
She is very pleased that he will be able to go to [facility] this
morning.

## 2020-05-28 NOTE — NUR
Patient incontinent of bowel and urine. Pericare provided. Assisted patient to
dress. INT to right hand discontinued, catheter tip intact. No further needs
at this time. Patient out by wheelchair with surgical staff. Report called to
regan.